# Patient Record
Sex: MALE | Race: OTHER | HISPANIC OR LATINO | URBAN - METROPOLITAN AREA
[De-identification: names, ages, dates, MRNs, and addresses within clinical notes are randomized per-mention and may not be internally consistent; named-entity substitution may affect disease eponyms.]

---

## 2018-09-06 ENCOUNTER — EMERGENCY (EMERGENCY)
Facility: HOSPITAL | Age: 43
LOS: 1 days | Discharge: HOME | End: 2018-09-06
Attending: EMERGENCY MEDICINE
Payer: COMMERCIAL

## 2018-09-06 VITALS — WEIGHT: 179.9 LBS | HEIGHT: 66 IN

## 2018-09-06 LAB
ALBUMIN SERPL ELPH-MCNC: 4.1 G/DL — SIGNIFICANT CHANGE UP (ref 3.3–5.2)
ALP SERPL-CCNC: 63 U/L — SIGNIFICANT CHANGE UP (ref 40–120)
ALT FLD-CCNC: 26 U/L — SIGNIFICANT CHANGE UP
ANION GAP SERPL CALC-SCNC: 11 MMOL/L — SIGNIFICANT CHANGE UP (ref 5–17)
APTT BLD: 29.2 SEC — SIGNIFICANT CHANGE UP (ref 27.5–37.4)
AST SERPL-CCNC: 20 U/L — SIGNIFICANT CHANGE UP
BASOPHILS # BLD AUTO: 0 K/UL — SIGNIFICANT CHANGE UP (ref 0–0.2)
BASOPHILS NFR BLD AUTO: 0.1 % — SIGNIFICANT CHANGE UP (ref 0–2)
BILIRUB SERPL-MCNC: <0.2 MG/DL — LOW (ref 0.4–2)
BLD GP AB SCN SERPL QL: SIGNIFICANT CHANGE UP
BUN SERPL-MCNC: 17 MG/DL — SIGNIFICANT CHANGE UP (ref 8–20)
CALCIUM SERPL-MCNC: 9 MG/DL — SIGNIFICANT CHANGE UP (ref 8.6–10.2)
CHLORIDE SERPL-SCNC: 105 MMOL/L — SIGNIFICANT CHANGE UP (ref 98–107)
CO2 SERPL-SCNC: 24 MMOL/L — SIGNIFICANT CHANGE UP (ref 22–29)
CREAT SERPL-MCNC: 0.82 MG/DL — SIGNIFICANT CHANGE UP (ref 0.5–1.3)
EOSINOPHIL # BLD AUTO: 0.2 K/UL — SIGNIFICANT CHANGE UP (ref 0–0.5)
EOSINOPHIL NFR BLD AUTO: 3.2 % — SIGNIFICANT CHANGE UP (ref 0–5)
GLUCOSE SERPL-MCNC: 142 MG/DL — HIGH (ref 70–115)
HCT VFR BLD CALC: 42.8 % — SIGNIFICANT CHANGE UP (ref 42–52)
HGB BLD-MCNC: 13.7 G/DL — LOW (ref 14–18)
INR BLD: 1.05 RATIO — SIGNIFICANT CHANGE UP (ref 0.88–1.16)
LYMPHOCYTES # BLD AUTO: 1.5 K/UL — SIGNIFICANT CHANGE UP (ref 1–4.8)
LYMPHOCYTES # BLD AUTO: 22.5 % — SIGNIFICANT CHANGE UP (ref 20–55)
MCHC RBC-ENTMCNC: 28.7 PG — SIGNIFICANT CHANGE UP (ref 27–31)
MCHC RBC-ENTMCNC: 32 G/DL — SIGNIFICANT CHANGE UP (ref 32–36)
MCV RBC AUTO: 89.5 FL — SIGNIFICANT CHANGE UP (ref 80–94)
MONOCYTES # BLD AUTO: 0.6 K/UL — SIGNIFICANT CHANGE UP (ref 0–0.8)
MONOCYTES NFR BLD AUTO: 8.9 % — SIGNIFICANT CHANGE UP (ref 3–10)
NEUTROPHILS # BLD AUTO: 4.4 K/UL — SIGNIFICANT CHANGE UP (ref 1.8–8)
NEUTROPHILS NFR BLD AUTO: 65 % — SIGNIFICANT CHANGE UP (ref 37–73)
PLATELET # BLD AUTO: 167 K/UL — SIGNIFICANT CHANGE UP (ref 150–400)
POTASSIUM SERPL-MCNC: 3.8 MMOL/L — SIGNIFICANT CHANGE UP (ref 3.5–5.3)
POTASSIUM SERPL-SCNC: 3.8 MMOL/L — SIGNIFICANT CHANGE UP (ref 3.5–5.3)
PROT SERPL-MCNC: 7.4 G/DL — SIGNIFICANT CHANGE UP (ref 6.6–8.7)
PROTHROM AB SERPL-ACNC: 11.6 SEC — SIGNIFICANT CHANGE UP (ref 9.8–12.7)
RBC # BLD: 4.78 M/UL — SIGNIFICANT CHANGE UP (ref 4.6–6.2)
RBC # FLD: 13.1 % — SIGNIFICANT CHANGE UP (ref 11–15.6)
SODIUM SERPL-SCNC: 140 MMOL/L — SIGNIFICANT CHANGE UP (ref 135–145)
TYPE + AB SCN PNL BLD: SIGNIFICANT CHANGE UP
WBC # BLD: 6.8 K/UL — SIGNIFICANT CHANGE UP (ref 4.8–10.8)
WBC # FLD AUTO: 6.8 K/UL — SIGNIFICANT CHANGE UP (ref 4.8–10.8)

## 2018-09-06 PROCEDURE — 73630 X-RAY EXAM OF FOOT: CPT | Mod: 26,RT

## 2018-09-06 PROCEDURE — 73590 X-RAY EXAM OF LOWER LEG: CPT | Mod: 26,RT

## 2018-09-06 PROCEDURE — 99283 EMERGENCY DEPT VISIT LOW MDM: CPT

## 2018-09-06 PROCEDURE — 73610 X-RAY EXAM OF ANKLE: CPT | Mod: 26,RT,77

## 2018-09-06 PROCEDURE — 73610 X-RAY EXAM OF ANKLE: CPT | Mod: 26,RT

## 2018-09-06 RX ORDER — PROPOFOL 10 MG/ML
100 INJECTION, EMULSION INTRAVENOUS ONCE
Qty: 0 | Refills: 0 | Status: COMPLETED | OUTPATIENT
Start: 2018-09-06 | End: 2018-09-06

## 2018-09-06 RX ORDER — MORPHINE SULFATE 50 MG/1
2 CAPSULE, EXTENDED RELEASE ORAL ONCE
Qty: 0 | Refills: 0 | Status: DISCONTINUED | OUTPATIENT
Start: 2018-09-06 | End: 2018-09-06

## 2018-09-06 RX ORDER — FENTANYL CITRATE 50 UG/ML
100 INJECTION INTRAVENOUS ONCE
Qty: 0 | Refills: 0 | Status: DISCONTINUED | OUTPATIENT
Start: 2018-09-06 | End: 2018-09-06

## 2018-09-06 RX ADMIN — FENTANYL CITRATE 100 MICROGRAM(S): 50 INJECTION INTRAVENOUS at 21:46

## 2018-09-06 RX ADMIN — PROPOFOL 100 MILLIGRAM(S): 10 INJECTION, EMULSION INTRAVENOUS at 21:46

## 2018-09-06 RX ADMIN — MORPHINE SULFATE 2 MILLIGRAM(S): 50 CAPSULE, EXTENDED RELEASE ORAL at 21:17

## 2018-09-06 NOTE — ED ADULT NURSE NOTE - NSIMPLEMENTINTERV_GEN_ALL_ED
Implemented All Universal Safety Interventions:  [place] to call system. Call bell, personal items and telephone within reach. Instruct patient to call for assistance. Room bathroom lighting operational. Non-slip footwear when patient is off stretcher. Physically safe environment: no spills, clutter or unnecessary equipment. Stretcher in lowest position, wheels locked, appropriate side rails in place.
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
## 2018-09-06 NOTE — ED STATDOCS - MUSCULOSKELETAL, MLM
no laxity to right ankle, joint intact, dp 2+, able to move toes, swelling to lateral malleolus. slight deformity at ankle

## 2018-09-06 NOTE — ED STATDOCS - PROGRESS NOTE DETAILS
PT evaluated by intake physician. HPI/PE/ROS as noted above. Will follow up plan per intake physician. + distal tib/fib fracture, Consult Ortho, PA to see Pt in ED. + distal tib/fib fracture, Consult Ortho PA to see Pt in ED.  conscious sedation consent obtained, Pt aware of risks and agrees with procedure. Ortho request CT ankle, MD advises Pt to follow up tomorrow as out pt. MD has Pt number and will contact Pt tomorrow. referral to Ortho MD given. Pt case signed out to ALISSA Jean Baptitse to follow CT ankle, dc home with crutches and ortho follow up PT seen resting comfortably in no acute distress, no acute complaints at this time, PT tolerating PO intake,  PT informed of all results, , PT will be DC home with follow up to ortho, PT educated on proper use of crutches and demonstrated proficient use prior to discharge. pain medication pt educated about when to return to the ED if needed. PT verbalizes that he understands all instructions and results.

## 2018-09-06 NOTE — ED STATDOCS - ATTENDING CONTRIBUTION TO CARE
I, Oscar Ford, performed the initial face to face bedside interview with this patient regarding history of present illness, review of symptoms and relevant past medical, social and family history.  I completed an independent physical examination.  I was the initial provider who evaluated this patient. I have signed out the follow up of any pending tests (i.e. labs, radiological studies) to the ACP.  I have communicated the patient’s plan of care and disposition with the ACP.

## 2018-09-07 VITALS
TEMPERATURE: 98 F | HEART RATE: 64 BPM | DIASTOLIC BLOOD PRESSURE: 79 MMHG | SYSTOLIC BLOOD PRESSURE: 128 MMHG | OXYGEN SATURATION: 98 % | RESPIRATION RATE: 20 BRPM

## 2018-09-07 PROBLEM — Z00.00 ENCOUNTER FOR PREVENTIVE HEALTH EXAMINATION: Status: ACTIVE | Noted: 2018-09-07

## 2018-09-07 PROCEDURE — 85730 THROMBOPLASTIN TIME PARTIAL: CPT

## 2018-09-07 PROCEDURE — 99285 EMERGENCY DEPT VISIT HI MDM: CPT | Mod: 25

## 2018-09-07 PROCEDURE — 27810 TREATMENT OF ANKLE FRACTURE: CPT | Mod: RT

## 2018-09-07 PROCEDURE — 73700 CT LOWER EXTREMITY W/O DYE: CPT

## 2018-09-07 PROCEDURE — 86901 BLOOD TYPING SEROLOGIC RH(D): CPT

## 2018-09-07 PROCEDURE — 96374 THER/PROPH/DIAG INJ IV PUSH: CPT | Mod: XU

## 2018-09-07 PROCEDURE — 73630 X-RAY EXAM OF FOOT: CPT

## 2018-09-07 PROCEDURE — 73700 CT LOWER EXTREMITY W/O DYE: CPT | Mod: 26,RT

## 2018-09-07 PROCEDURE — 36415 COLL VENOUS BLD VENIPUNCTURE: CPT

## 2018-09-07 PROCEDURE — 86900 BLOOD TYPING SEROLOGIC ABO: CPT

## 2018-09-07 PROCEDURE — 73590 X-RAY EXAM OF LOWER LEG: CPT

## 2018-09-07 PROCEDURE — 86850 RBC ANTIBODY SCREEN: CPT

## 2018-09-07 PROCEDURE — 73610 X-RAY EXAM OF ANKLE: CPT

## 2018-09-07 PROCEDURE — 80053 COMPREHEN METABOLIC PANEL: CPT

## 2018-09-07 PROCEDURE — 85610 PROTHROMBIN TIME: CPT

## 2018-09-07 PROCEDURE — 85027 COMPLETE CBC AUTOMATED: CPT

## 2018-09-07 PROCEDURE — T1013: CPT

## 2018-09-07 PROCEDURE — 96375 TX/PRO/DX INJ NEW DRUG ADDON: CPT | Mod: XU

## 2018-09-07 RX ORDER — IBUPROFEN 200 MG
1 TABLET ORAL
Qty: 20 | Refills: 0 | OUTPATIENT
Start: 2018-09-07 | End: 2018-09-11

## 2018-09-07 RX ORDER — OXYCODONE AND ACETAMINOPHEN 5; 325 MG/1; MG/1
1 TABLET ORAL ONCE
Qty: 0 | Refills: 0 | Status: DISCONTINUED | OUTPATIENT
Start: 2018-09-07 | End: 2018-09-07

## 2018-09-07 RX ADMIN — OXYCODONE AND ACETAMINOPHEN 1 TABLET(S): 5; 325 TABLET ORAL at 02:24

## 2018-09-07 NOTE — PROCEDURE NOTE - NSPERIPNEUEVAL_GEN_A_CORE
Post-application: Motor, sensory, and vascular responses intact in the injured extremity./Pre-application: Motor, sensory, and vascular responses intact in the injured extremity .
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
## 2018-09-07 NOTE — PROCEDURE NOTE - NSPOSTCAREGUIDE_GEN_A_CORE
Keep the cast/splint/dressing clean and dry/Understanding of care for injured extremity verbalized/Instructed patient/caregiver to follow-up with primary care physician/Elevate the injured extremity as instructed

## 2018-09-07 NOTE — PROCEDURE NOTE - NSPERIPVASCEVA_GEN_A_CORE
fingers/toes warm to touch/swelling/no cyanosis of extremity/no paresthesia/capillary refill time < 2 seconds/pre-application: responses intact/post-application: responses intact

## 2018-09-10 ENCOUNTER — APPOINTMENT (OUTPATIENT)
Dept: ORTHOPEDIC SURGERY | Facility: CLINIC | Age: 43
End: 2018-09-10
Payer: COMMERCIAL

## 2018-09-10 VITALS
WEIGHT: 180 LBS | BODY MASS INDEX: 28.93 KG/M2 | SYSTOLIC BLOOD PRESSURE: 150 MMHG | DIASTOLIC BLOOD PRESSURE: 86 MMHG | HEIGHT: 66 IN | HEART RATE: 77 BPM

## 2018-09-10 PROCEDURE — 99203 OFFICE O/P NEW LOW 30 MIN: CPT

## 2018-09-13 ENCOUNTER — OUTPATIENT (OUTPATIENT)
Dept: OUTPATIENT SERVICES | Facility: HOSPITAL | Age: 43
LOS: 1 days | End: 2018-09-13
Payer: COMMERCIAL

## 2018-09-13 VITALS
HEIGHT: 66 IN | HEART RATE: 80 BPM | RESPIRATION RATE: 20 BRPM | WEIGHT: 179.9 LBS | TEMPERATURE: 98 F | DIASTOLIC BLOOD PRESSURE: 84 MMHG | SYSTOLIC BLOOD PRESSURE: 126 MMHG

## 2018-09-13 DIAGNOSIS — Z29.9 ENCOUNTER FOR PROPHYLACTIC MEASURES, UNSPECIFIED: ICD-10-CM

## 2018-09-13 DIAGNOSIS — Z01.818 ENCOUNTER FOR OTHER PREPROCEDURAL EXAMINATION: ICD-10-CM

## 2018-09-13 DIAGNOSIS — S82.899A OTHER FRACTURE OF UNSPECIFIED LOWER LEG, INITIAL ENCOUNTER FOR CLOSED FRACTURE: ICD-10-CM

## 2018-09-13 LAB
APPEARANCE UR: CLEAR — SIGNIFICANT CHANGE UP
BILIRUB UR-MCNC: NEGATIVE — SIGNIFICANT CHANGE UP
COLOR SPEC: YELLOW — SIGNIFICANT CHANGE UP
DIFF PNL FLD: ABNORMAL
EPI CELLS # UR: NEGATIVE — SIGNIFICANT CHANGE UP
GLUCOSE UR QL: NEGATIVE MG/DL — SIGNIFICANT CHANGE UP
KETONES UR-MCNC: NEGATIVE — SIGNIFICANT CHANGE UP
LEUKOCYTE ESTERASE UR-ACNC: NEGATIVE — SIGNIFICANT CHANGE UP
MRSA PCR RESULT.: SIGNIFICANT CHANGE UP
NITRITE UR-MCNC: NEGATIVE — SIGNIFICANT CHANGE UP
PH UR: 6.5 — SIGNIFICANT CHANGE UP (ref 5–8)
PROT UR-MCNC: NEGATIVE MG/DL — SIGNIFICANT CHANGE UP
RBC CASTS # UR COMP ASSIST: SIGNIFICANT CHANGE UP /HPF (ref 0–4)
S AUREUS DNA NOSE QL NAA+PROBE: SIGNIFICANT CHANGE UP
SP GR SPEC: 1.01 — SIGNIFICANT CHANGE UP (ref 1.01–1.02)
UROBILINOGEN FLD QL: NEGATIVE MG/DL — SIGNIFICANT CHANGE UP
WBC UR QL: SIGNIFICANT CHANGE UP

## 2018-09-13 PROCEDURE — 93010 ELECTROCARDIOGRAM REPORT: CPT

## 2018-09-13 PROCEDURE — 87641 MR-STAPH DNA AMP PROBE: CPT

## 2018-09-13 PROCEDURE — 93005 ELECTROCARDIOGRAM TRACING: CPT

## 2018-09-13 PROCEDURE — 71046 X-RAY EXAM CHEST 2 VIEWS: CPT

## 2018-09-13 PROCEDURE — G0463: CPT

## 2018-09-13 PROCEDURE — 71046 X-RAY EXAM CHEST 2 VIEWS: CPT | Mod: 26

## 2018-09-13 PROCEDURE — 87640 STAPH A DNA AMP PROBE: CPT

## 2018-09-13 PROCEDURE — 81001 URINALYSIS AUTO W/SCOPE: CPT

## 2018-09-13 RX ORDER — INFLUENZA VIRUS VACCINE 15; 15; 15; 15 UG/.5ML; UG/.5ML; UG/.5ML; UG/.5ML
0.5 SUSPENSION INTRAMUSCULAR ONCE
Qty: 0 | Refills: 0 | Status: DISCONTINUED | OUTPATIENT
Start: 2018-09-13 | End: 2018-09-28
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
## 2018-09-13 NOTE — H&P PST ADULT - ASSESSMENT
Pt is a 43 y.o male with no significant PMH undergoing open reduction internal fixation right ankle. Instructed to hold any medications containing ibuprofen and aspirin starting today.   CAPRINI SCORE [CLOT]    AGE RELATED RISK FACTORS                                                       MOBILITY RELATED FACTORS  [x ] Age 41-60 years                                            (1 Point)                  [ ] Bed rest                                                        (1 Point)  [ ] Age: 61-74 years                                           (2 Points)                 [x] Plaster cast                                                   (2 Points)  [ ] Age= 75 years                                              (3 Points)                 [ ] Bed bound for more than 72 hours                 (2 Points)    DISEASE RELATED RISK FACTORS                                               GENDER SPECIFIC FACTORS  [ ] Edema in the lower extremities                       (1 Point)                  [ ] Pregnancy                                                     (1 Point)  [ ] Varicose veins                                               (1 Point)                  [ ] Post-partum < 6 weeks                                   (1 Point)             [x ] BMI > 25 Kg/m2                                            (1 Point)                  [ ] Hormonal therapy  or oral contraception          (1 Point)                 [ ] Sepsis (in the previous month)                        (1 Point)                  [ ] History of pregnancy complications                 (1 point)  [ ] Pneumonia or serious lung disease                                               [ ] Unexplained or recurrent                     (1 Point)           (in the previous month)                               (1 Point)  [ ] Abnormal pulmonary function test                     (1 Point)                 SURGERY RELATED RISK FACTORS  [ ] Acute myocardial infarction                              (1 Point)                 [ ]  Section                                             (1 Point)  [ ] Congestive heart failure (in the previous month)  (1 Point)               [ ] Minor surgery                                                  (1 Point)   [ ] Inflammatory bowel disease                             (1 Point)                 [ ] Arthroscopic surgery                                        (2 Points)  [ ] Central venous access                                      (2 Points)                [ x] General surgery lasting more than 45 minutes   (2 Points)       [ ] Stroke (in the previous month)                          (5 Points)               [ ] Elective arthroplasty                                         (5 Points)                                                                                                                                               HEMATOLOGY RELATED FACTORS                                                 TRAUMA RELATED RISK FACTORS  [ ] Prior episodes of VTE                                     (3 Points)                 [ ] Fracture of the hip, pelvis, or leg                       (5 Points)  [ ] Positive family history for VTE                         (3 Points)                 [ ] Acute spinal cord injury (in the previous month)  (5 Points)  [ ] Prothrombin 26482 A                                     (3 Points)                 [ ] Paralysis  (less than 1 month)                             (5 Points)  [ ] Factor V Leiden                                             (3 Points)                  [ ] Multiple Trauma within 1 month                        (5 Points)  [ ] Lupus anticoagulants                                     (3 Points)                                                           [ ] Anticardiolipin antibodies                               (3 Points)                                                       [ ] High homocysteine in the blood                      (3 Points)                                             [ ] Other congenital or acquired thrombophilia      (3 Points)                                                [ ] Heparin induced thrombocytopenia                  (3 Points)                                          Total Score [  6        ]

## 2018-09-13 NOTE — PATIENT PROFILE ADULT. - LANGUAGE ASSISTANCE NEEDED
Drew  [facility] [phone]/Yes-Patient/Caregiver accepts free interpretation services...

## 2018-09-13 NOTE — H&P PST ADULT - FAMILY HISTORY
Mother  Still living? No  Family history of diabetes mellitus, Age at diagnosis: 61-70  Family history of breast cancer in mother, Age at diagnosis: 61-70     Father  Still living? No  Family history of throat cancer, Age at diagnosis: 41-50  Family history of alcoholism in father, Age at diagnosis: 41-50

## 2018-09-13 NOTE — PATIENT PROFILE ADULT. - LEARNING ASSESSMENT (PATIENT) ADDITIONAL COMMENTS
Instructed pt verbally in Cambodian via telephone  and in writing in Cambodian on pre-op instructions, tips for safer surgery, pain management scale, and verbalized understanding of all.

## 2018-09-13 NOTE — H&P PST ADULT - NSANTHOSAYNRD_GEN_A_CORE
No. SANGEETA screening performed.  STOP BANG Legend: 0-2 = LOW Risk; 3-4 = INTERMEDIATE Risk; 5-8 = HIGH Risk

## 2018-09-16 PROBLEM — F17.200 CURRENT SMOKER: Status: ACTIVE | Noted: 2018-09-10

## 2018-09-16 PROBLEM — Z80.9 FAMILY HISTORY OF MALIGNANT NEOPLASM: Status: ACTIVE | Noted: 2018-09-10

## 2018-09-16 PROBLEM — Z78.9 CONSUMES ALCOHOL OCCASIONALLY: Status: ACTIVE | Noted: 2018-09-10

## 2018-09-17 ENCOUNTER — APPOINTMENT (OUTPATIENT)
Dept: ORTHOPEDIC SURGERY | Facility: CLINIC | Age: 43
End: 2018-09-17
Payer: COMMERCIAL

## 2018-09-17 VITALS
WEIGHT: 180 LBS | HEART RATE: 82 BPM | HEIGHT: 66 IN | SYSTOLIC BLOOD PRESSURE: 138 MMHG | DIASTOLIC BLOOD PRESSURE: 80 MMHG | BODY MASS INDEX: 28.93 KG/M2

## 2018-09-17 DIAGNOSIS — Z80.9 FAMILY HISTORY OF MALIGNANT NEOPLASM, UNSPECIFIED: ICD-10-CM

## 2018-09-17 DIAGNOSIS — Z78.9 OTHER SPECIFIED HEALTH STATUS: ICD-10-CM

## 2018-09-17 DIAGNOSIS — F17.200 NICOTINE DEPENDENCE, UNSPECIFIED, UNCOMPLICATED: ICD-10-CM

## 2018-09-17 PROCEDURE — 99024 POSTOP FOLLOW-UP VISIT: CPT

## 2018-09-18 ENCOUNTER — INPATIENT (INPATIENT)
Facility: HOSPITAL | Age: 43
LOS: 0 days | Discharge: HOME | DRG: 493 | End: 2018-09-19
Attending: ORTHOPAEDIC SURGERY | Admitting: ORTHOPAEDIC SURGERY
Payer: SELF-PAY

## 2018-09-18 ENCOUNTER — TRANSCRIPTION ENCOUNTER (OUTPATIENT)
Age: 43
End: 2018-09-18

## 2018-09-18 VITALS
TEMPERATURE: 98 F | RESPIRATION RATE: 16 BRPM | OXYGEN SATURATION: 99 % | SYSTOLIC BLOOD PRESSURE: 164 MMHG | HEIGHT: 66 IN | DIASTOLIC BLOOD PRESSURE: 81 MMHG | HEART RATE: 77 BPM | WEIGHT: 179.9 LBS

## 2018-09-18 DIAGNOSIS — S82.899A OTHER FRACTURE OF UNSPECIFIED LOWER LEG, INITIAL ENCOUNTER FOR CLOSED FRACTURE: ICD-10-CM

## 2018-09-18 DIAGNOSIS — S82.841A DISPLACED BIMALLEOLAR FRACTURE OF RIGHT LOWER LEG, INITIAL ENCOUNTER FOR CLOSED FRACTURE: ICD-10-CM

## 2018-09-18 LAB
ALLERGY+IMMUNOLOGY DIAG STUDY NOTE: SIGNIFICANT CHANGE UP
BLD GP AB SCN SERPL QL: ABNORMAL
BLD GP AB SCN SERPL QL: SIGNIFICANT CHANGE UP
DIR ANTIGLOB POLYSPECIFIC INTERPRETATION: SIGNIFICANT CHANGE UP
TYPE + AB SCN PNL BLD: SIGNIFICANT CHANGE UP

## 2018-09-18 PROCEDURE — 86077 PHYS BLOOD BANK SERV XMATCH: CPT

## 2018-09-18 RX ORDER — OXYCODONE HYDROCHLORIDE 5 MG/1
1 TABLET ORAL
Qty: 54 | Refills: 0 | OUTPATIENT
Start: 2018-09-18 | End: 2018-09-26

## 2018-09-18 RX ORDER — OXYCODONE HYDROCHLORIDE 5 MG/1
5 TABLET ORAL ONCE
Qty: 0 | Refills: 0 | Status: DISCONTINUED | OUTPATIENT
Start: 2018-09-18 | End: 2018-09-18

## 2018-09-18 RX ORDER — SODIUM CHLORIDE 9 MG/ML
1000 INJECTION, SOLUTION INTRAVENOUS
Qty: 0 | Refills: 0 | Status: DISCONTINUED | OUTPATIENT
Start: 2018-09-18 | End: 2018-09-19

## 2018-09-18 RX ORDER — HYDROMORPHONE HYDROCHLORIDE 2 MG/ML
0.5 INJECTION INTRAMUSCULAR; INTRAVENOUS; SUBCUTANEOUS
Qty: 0 | Refills: 0 | Status: DISCONTINUED | OUTPATIENT
Start: 2018-09-18 | End: 2018-09-18

## 2018-09-18 RX ORDER — FENTANYL CITRATE 50 UG/ML
25 INJECTION INTRAVENOUS
Qty: 0 | Refills: 0 | Status: DISCONTINUED | OUTPATIENT
Start: 2018-09-18 | End: 2018-09-18

## 2018-09-18 RX ORDER — FENTANYL CITRATE 50 UG/ML
50 INJECTION INTRAVENOUS
Qty: 0 | Refills: 0 | Status: DISCONTINUED | OUTPATIENT
Start: 2018-09-18 | End: 2018-09-19

## 2018-09-18 RX ORDER — OXYCODONE HYDROCHLORIDE 5 MG/1
5 TABLET ORAL
Qty: 0 | Refills: 0 | Status: DISCONTINUED | OUTPATIENT
Start: 2018-09-18 | End: 2018-09-19

## 2018-09-18 RX ORDER — HYDROMORPHONE HYDROCHLORIDE 2 MG/ML
0.5 INJECTION INTRAMUSCULAR; INTRAVENOUS; SUBCUTANEOUS EVERY 6 HOURS
Qty: 0 | Refills: 0 | Status: DISCONTINUED | OUTPATIENT
Start: 2018-09-18 | End: 2018-09-19

## 2018-09-18 RX ORDER — SODIUM CHLORIDE 9 MG/ML
3 INJECTION INTRAMUSCULAR; INTRAVENOUS; SUBCUTANEOUS ONCE
Qty: 0 | Refills: 0 | Status: DISCONTINUED | OUTPATIENT
Start: 2018-09-18 | End: 2018-09-18

## 2018-09-18 RX ORDER — CEFAZOLIN SODIUM 1 G
2000 VIAL (EA) INJECTION ONCE
Qty: 0 | Refills: 0 | Status: DISCONTINUED | OUTPATIENT
Start: 2018-09-18 | End: 2018-09-18

## 2018-09-18 RX ORDER — ONDANSETRON 8 MG/1
4 TABLET, FILM COATED ORAL ONCE
Qty: 0 | Refills: 0 | Status: DISCONTINUED | OUTPATIENT
Start: 2018-09-18 | End: 2018-09-19

## 2018-09-18 RX ORDER — OXYCODONE HYDROCHLORIDE 5 MG/1
5 TABLET ORAL EVERY 4 HOURS
Qty: 0 | Refills: 0 | Status: DISCONTINUED | OUTPATIENT
Start: 2018-09-18 | End: 2018-09-18

## 2018-09-18 RX ORDER — ASPIRIN/CALCIUM CARB/MAGNESIUM 324 MG
1 TABLET ORAL
Qty: 56 | Refills: 0 | OUTPATIENT
Start: 2018-09-18 | End: 2018-10-15

## 2018-09-18 RX ORDER — OXYCODONE 5 MG/1
5 TABLET ORAL
Qty: 50 | Refills: 0 | Status: ACTIVE | COMMUNITY
Start: 2018-09-18 | End: 1900-01-01

## 2018-09-18 RX ORDER — ONDANSETRON 8 MG/1
1 TABLET, FILM COATED ORAL
Qty: 40 | Refills: 0 | OUTPATIENT
Start: 2018-09-18 | End: 2018-09-27

## 2018-09-18 RX ORDER — HYDROMORPHONE HYDROCHLORIDE 2 MG/ML
2 INJECTION INTRAMUSCULAR; INTRAVENOUS; SUBCUTANEOUS EVERY 4 HOURS
Qty: 0 | Refills: 0 | Status: DISCONTINUED | OUTPATIENT
Start: 2018-09-18 | End: 2018-09-19

## 2018-09-18 RX ORDER — ONDANSETRON 4 MG/1
4 TABLET ORAL
Qty: 56 | Refills: 0 | Status: ACTIVE | COMMUNITY
Start: 2018-09-18 | End: 1900-01-01

## 2018-09-18 RX ORDER — HYDROMORPHONE HYDROCHLORIDE 2 MG/ML
1 INJECTION INTRAMUSCULAR; INTRAVENOUS; SUBCUTANEOUS
Qty: 0 | Refills: 0 | Status: COMPLETED | OUTPATIENT
Start: 2018-09-18 | End: 2018-09-19

## 2018-09-18 RX ORDER — ASPIRIN 325 MG/1
325 TABLET, FILM COATED ORAL TWICE DAILY
Qty: 56 | Refills: 0 | Status: ACTIVE | COMMUNITY
Start: 2018-09-18 | End: 1900-01-01

## 2018-09-18 RX ORDER — ACETAMINOPHEN 500 MG
975 TABLET ORAL EVERY 8 HOURS
Qty: 0 | Refills: 0 | Status: DISCONTINUED | OUTPATIENT
Start: 2018-09-18 | End: 2018-09-19

## 2018-09-18 RX ORDER — ENOXAPARIN SODIUM 100 MG/ML
40 INJECTION SUBCUTANEOUS DAILY
Qty: 0 | Refills: 0 | Status: DISCONTINUED | OUTPATIENT
Start: 2018-09-19 | End: 2018-09-19

## 2018-09-18 RX ORDER — OXYCODONE HYDROCHLORIDE 5 MG/1
10 TABLET ORAL
Qty: 0 | Refills: 0 | Status: DISCONTINUED | OUTPATIENT
Start: 2018-09-18 | End: 2018-09-19

## 2018-09-18 RX ORDER — CEFAZOLIN SODIUM 1 G
2000 VIAL (EA) INJECTION EVERY 6 HOURS
Qty: 0 | Refills: 0 | Status: COMPLETED | OUTPATIENT
Start: 2018-09-18 | End: 2018-09-19

## 2018-09-18 RX ADMIN — HYDROMORPHONE HYDROCHLORIDE 0.5 MILLIGRAM(S): 2 INJECTION INTRAMUSCULAR; INTRAVENOUS; SUBCUTANEOUS at 22:00

## 2018-09-18 RX ADMIN — OXYCODONE HYDROCHLORIDE 5 MILLIGRAM(S): 5 TABLET ORAL at 22:23

## 2018-09-18 RX ADMIN — FENTANYL CITRATE 25 MICROGRAM(S): 50 INJECTION INTRAVENOUS at 21:50

## 2018-09-18 RX ADMIN — FENTANYL CITRATE 25 MICROGRAM(S): 50 INJECTION INTRAVENOUS at 22:00

## 2018-09-18 RX ADMIN — SODIUM CHLORIDE 100 MILLILITER(S): 9 INJECTION, SOLUTION INTRAVENOUS at 23:19

## 2018-09-18 RX ADMIN — OXYCODONE HYDROCHLORIDE 5 MILLIGRAM(S): 5 TABLET ORAL at 22:41

## 2018-09-18 RX ADMIN — SODIUM CHLORIDE 75 MILLILITER(S): 9 INJECTION, SOLUTION INTRAVENOUS at 21:56

## 2018-09-18 RX ADMIN — FENTANYL CITRATE 25 MICROGRAM(S): 50 INJECTION INTRAVENOUS at 22:09

## 2018-09-18 RX ADMIN — HYDROMORPHONE HYDROCHLORIDE 0.5 MILLIGRAM(S): 2 INJECTION INTRAMUSCULAR; INTRAVENOUS; SUBCUTANEOUS at 21:58

## 2018-09-18 RX ADMIN — HYDROMORPHONE HYDROCHLORIDE 0.5 MILLIGRAM(S): 2 INJECTION INTRAMUSCULAR; INTRAVENOUS; SUBCUTANEOUS at 22:13

## 2018-09-18 RX ADMIN — HYDROMORPHONE HYDROCHLORIDE 0.5 MILLIGRAM(S): 2 INJECTION INTRAMUSCULAR; INTRAVENOUS; SUBCUTANEOUS at 21:48

## 2018-09-18 RX ADMIN — FENTANYL CITRATE 25 MICROGRAM(S): 50 INJECTION INTRAVENOUS at 22:15

## 2018-09-18 RX ADMIN — FENTANYL CITRATE 25 MICROGRAM(S): 50 INJECTION INTRAVENOUS at 22:06

## 2018-09-18 RX ADMIN — HYDROMORPHONE HYDROCHLORIDE 0.5 MILLIGRAM(S): 2 INJECTION INTRAMUSCULAR; INTRAVENOUS; SUBCUTANEOUS at 22:41

## 2018-09-18 RX ADMIN — FENTANYL CITRATE 25 MICROGRAM(S): 50 INJECTION INTRAVENOUS at 22:05

## 2018-09-18 RX ADMIN — HYDROMORPHONE HYDROCHLORIDE 0.5 MILLIGRAM(S): 2 INJECTION INTRAMUSCULAR; INTRAVENOUS; SUBCUTANEOUS at 22:09

## 2018-09-18 RX ADMIN — HYDROMORPHONE HYDROCHLORIDE 1 MILLIGRAM(S): 2 INJECTION INTRAMUSCULAR; INTRAVENOUS; SUBCUTANEOUS at 23:50

## 2018-09-18 RX ADMIN — OXYCODONE HYDROCHLORIDE 5 MILLIGRAM(S): 5 TABLET ORAL at 22:06

## 2018-09-18 RX ADMIN — FENTANYL CITRATE 25 MICROGRAM(S): 50 INJECTION INTRAVENOUS at 22:14

## 2018-09-18 RX ADMIN — HYDROMORPHONE HYDROCHLORIDE 0.5 MILLIGRAM(S): 2 INJECTION INTRAMUSCULAR; INTRAVENOUS; SUBCUTANEOUS at 22:23

## 2018-09-18 RX ADMIN — HYDROMORPHONE HYDROCHLORIDE 1 MILLIGRAM(S): 2 INJECTION INTRAMUSCULAR; INTRAVENOUS; SUBCUTANEOUS at 22:52

## 2018-09-18 NOTE — H&P PST ADULT - PROBLEM SELECTOR PLAN 1
npo  NWB  plan is OR for ORIF of ankle fracture  splint after procedure  ASA twice daily after procedure  pain medicataion  DC after procedure

## 2018-09-18 NOTE — ASU DISCHARGE PLAN (ADULT/PEDIATRIC). - MEDICATION SUMMARY - MEDICATIONS TO TAKE
I will START or STAY ON the medications listed below when I get home from the hospital:     mg oral tablet  -- 1 tab(s) by mouth every 6 hours   -- Do not take this drug if you are pregnant.  It is very important that you take or use this exactly as directed.  Do not skip doses or discontinue unless directed by your doctor.  May cause drowsiness or dizziness.  Obtain medical advice before taking any non-prescription drugs as some may affect the action of this medication.  Take with food or milk.    -- Indication: For as needed for mild pain    Percocet 5/325 oral tablet  -- 1 tab(s) by mouth every 6 hours MDD:4  -- Caution federal law prohibits the transfer of this drug to any person other  than the person for whom it was prescribed.  May cause drowsiness.  Alcohol may intensify this effect.  Use care when operating dangerous machinery.  This prescription cannot be refilled.  This product contains acetaminophen.  Do not use  with any other product containing acetaminophen to prevent possible liver damage.  Using more of this medication than prescribed may cause serious breathing problems.    -- Indication: For as needed for moderate to severe pain

## 2018-09-18 NOTE — DISCHARGE NOTE ADULT - PATIENT PORTAL LINK FT
You can access the [facility] Patient Portal, offered by [facility], by registering with the following website: [email or web address]

## 2018-09-18 NOTE — H&P PST ADULT - MUSCULOSKELETAL COMMENTS
ttp over lateral and medial mal, facture blister laterallymotor intact EHL/FHL/TA/GSC; SILT L4-S1, DP 2+

## 2018-09-18 NOTE — ASU DISCHARGE PLAN (ADULT/PEDIATRIC). - NOTIFY
Fever greater than 101/Swelling that continues/Persistent Nausea and Vomiting/Bleeding that does not stop/Numbness, color, or temperature change to extremity

## 2018-09-18 NOTE — DISCHARGE NOTE ADULT - CARE PROVIDER_API CALL
Ryne Figueroa (DO), [facility]  [address]  [place] [address]  Phone: [phone]  Fax: [phone]

## 2018-09-18 NOTE — DISCHARGE NOTE ADULT - MEDICATION SUMMARY - MEDICATIONS TO STOP TAKING
I will STOP taking the medications listed below when I get home from the hospital:     mg oral tablet  -- 1 tab(s) by mouth every 6 hours   -- Do not take this drug if you are pregnant.  It is very important that you take or use this exactly as directed.  Do not skip doses or discontinue unless directed by your doctor.  May cause drowsiness or dizziness.  Obtain medical advice before taking any non-prescription drugs as some may affect the action of this medication.  Take with food or milk.

## 2018-09-18 NOTE — ASU DISCHARGE PLAN (ADULT/PEDIATRIC). - ACTIVITY LEVEL
no heavy lifting/Nonweightbearing right leg with crutches for assistance with walking/no weight bearing/elevate extremity/no exercise

## 2018-09-18 NOTE — DISCHARGE NOTE ADULT - HOSPITAL COURSE
The patient underwent a RIGHT ANKLE OPEN REDUCTION AND INTERNAL FIXATION on 9/18/18for treatment of a right bimalleolar ankle fracture. The patient received antibiotics consistent with SCIP guidelines. The patient was medically cleared and underwent the procedure and had no intra-operative complications. Post-operatively, the patient was seen by medicine and PT. The patient received LOVENOX for DVTP. The patient received pain medications per orthopedic pain managment protocol and the pain was appropriately controlled. Patient was evaluated by PT and instructed on gait training. The patient was NON-weight bearing. The patient did not have any post-operative medical complications. The patient was discharged in stable condition. The patient underwent a RIGHT ANKLE OPEN REDUCTION AND INTERNAL FIXATION on 9/18/18for treatment of a right trimalleolar ankle fracture. The patient received antibiotics consistent with SCIP guidelines. The patient was medically cleared and underwent the procedure and had no intra-operative complications. Post-operatively, the patient was seen by medicine and PT. The patient received LOVENOX for DVTP. The patient received pain medications per orthopedic pain managment protocol and the pain was appropriately controlled. Patient was evaluated by PT and instructed on gait training. The patient was NON-weight bearing. The patient did not have any post-operative medical complications. The patient was discharged in stable condition. The patient underwent a RIGHT ANKLE OPEN REDUCTION AND INTERNAL FIXATION on 9/18/18for treatment of a right trimalleolar ankle fracture. The patient received antibiotics consistent with SCIP guidelines. The patient was medically cleared and underwent the procedure and had no intra-operative complications. Post-operatively, the patient was seen by medicine and PT. The patient received LOVENOX while in hospital  for DVTP. The patient received pain medications per orthopedic pain management protocol and the pain was appropriately controlled. Patient was evaluated by PT and instructed on gait training. The patient was NON-weight bearing. The patient did not have any post-operative medical complications. The patient was discharged in stable condition.

## 2018-09-18 NOTE — DISCHARGE NOTE ADULT - PLAN OF CARE
Improved function and pain control The patient will be seen in the office between 1-2 weeks for splint removal and wound check. Sutures/Staples will be removed at that time. Patient may NOT shower until after re-evaluation in the office. The patient will continue with splint as applied in hospital and not remove until re-evaluation in the office. The patient will contact the office if the wound becomes red, has increasing pain, develops bleeding or discharge, an injury occurs, or has other concerns. The patient will continue LOVENOX for 2 weeks and then begin ASPIRIN for DVTP. The patient will take oxycodone & tylenol for pain control and titrate according to prescription and patient needs. The patient is NON-weight bearing on the right LOWER EXTREMITY. The patient is recommended to elevated the affected extremity to reduce swelling. If the splint/cast  becomes too tight, the patient is to immediately elevate and contact the office to discuss further management or immediately proceed to the office if unable to make contact with the office. The patient will be seen in the office between 1-2 weeks for splint removal and wound check. Sutures/Staples will be removed at that time. Patient may NOT shower until after re-evaluation in the office. The patient will continue with splint as applied in hospital and not remove until re-evaluation in the office. The patient will contact the office if the wound becomes red, has increasing pain, develops bleeding or discharge, an injury occurs, or has other concerns. The patient will continue ASPIRIN 325 twice daily for 28 days for DVTP. The patient will take oxycodone & Tylenol for pain control and titrate according to prescription and patient needs. The patient is NON-weight bearing on the right LOWER EXTREMITY. The patient is recommended to elevated the affected extremity to reduce swelling. If the splint/cast  becomes too tight, the patient is to immediately elevate and contact the office to discuss further management or immediately proceed to the office if unable to make contact with the office.

## 2018-09-18 NOTE — BRIEF OPERATIVE NOTE - PROCEDURE
<<-----Click on this checkbox to enter Procedure ORIF fracture of ankle  09/18/2018    Active  RTRASOLINI

## 2018-09-18 NOTE — DISCHARGE NOTE ADULT - CARE PLAN
Principal Discharge DX:	Closed bimalleolar fracture of right ankle, initial encounter  Goal:	Improved function and pain control  Assessment and plan of treatment:	The patient will be seen in the office between 1-2 weeks for splint removal and wound check. Sutures/Staples will be removed at that time. Patient may NOT shower until after re-evaluation in the office. The patient will continue with splint as applied in hospital and not remove until re-evaluation in the office. The patient will contact the office if the wound becomes red, has increasing pain, develops bleeding or discharge, an injury occurs, or has other concerns. The patient will continue LOVENOX for 2 weeks and then begin ASPIRIN for DVTP. The patient will take oxycodone & tylenol for pain control and titrate according to prescription and patient needs. The patient is NON-weight bearing on the right LOWER EXTREMITY. The patient is recommended to elevated the affected extremity to reduce swelling. If the splint/cast  becomes too tight, the patient is to immediately elevate and contact the office to discuss further management or immediately proceed to the office if unable to make contact with the office. Principal Discharge DX:	Closed bimalleolar fracture of right ankle, initial encounter  Goal:	Improved function and pain control  Assessment and plan of treatment:	The patient will be seen in the office between 1-2 weeks for splint removal and wound check. Sutures/Staples will be removed at that time. Patient may NOT shower until after re-evaluation in the office. The patient will continue with splint as applied in hospital and not remove until re-evaluation in the office. The patient will contact the office if the wound becomes red, has increasing pain, develops bleeding or discharge, an injury occurs, or has other concerns. The patient will continue ASPIRIN 325 twice daily for 28 days for DVTP. The patient will take oxycodone & Tylenol for pain control and titrate according to prescription and patient needs. The patient is NON-weight bearing on the right LOWER EXTREMITY. The patient is recommended to elevated the affected extremity to reduce swelling. If the splint/cast  becomes too tight, the patient is to immediately elevate and contact the office to discuss further management or immediately proceed to the office if unable to make contact with the office.

## 2018-09-18 NOTE — PROGRESS NOTE ADULT - SUBJECTIVE AND OBJECTIVE BOX
Orthopedic PA Postop Note  Patient S/P open reduction internal fixation of right ankle fx  Patient in bed comfortable   RIGHT Leg  Dressing C/D/I - ACE wrap without staining  Posterior & U splints in the appropriate positioning  DP Pulse intact - confirmed with pulse ox wave form with strong amplitude consistent with patients heart rate  Palpable compartments soft and compressible  EHL/FHL intact   Sensation intact to light touch    Vital Signs Last 24 Hrs  T(C): 37.1 (18 Sep 2018 21:40), Max: 37.1 (18 Sep 2018 21:40)  T(F): 98.7 (18 Sep 2018 21:40), Max: 98.7 (18 Sep 2018 21:40)  HR: 76 (18 Sep 2018 23:05) (76 - 103)  BP: 144/81 (18 Sep 2018 23:05) (144/81 - 164/81)  BP(mean): 96 (18 Sep 2018 23:05) (96 - 108)  RR: 14 (18 Sep 2018 23:05) (12 - 20)  SpO2: 99% (18 Sep 2018 23:05) (95% - 100%)        A/P: 43M S/P RIGHT ANKLE open reduction internal fixation  1. DVTP - LOVENOX  2. Physical Therapy   3. Pain Control as clinically indicated

## 2018-09-19 VITALS
TEMPERATURE: 98 F | OXYGEN SATURATION: 98 % | HEART RATE: 78 BPM | DIASTOLIC BLOOD PRESSURE: 58 MMHG | RESPIRATION RATE: 20 BRPM | SYSTOLIC BLOOD PRESSURE: 112 MMHG

## 2018-09-19 PROCEDURE — 86870 RBC ANTIBODY IDENTIFICATION: CPT

## 2018-09-19 PROCEDURE — 36415 COLL VENOUS BLD VENIPUNCTURE: CPT

## 2018-09-19 PROCEDURE — 76000 FLUOROSCOPY <1 HR PHYS/QHP: CPT

## 2018-09-19 PROCEDURE — 90686 IIV4 VACC NO PRSV 0.5 ML IM: CPT

## 2018-09-19 PROCEDURE — C1713: CPT

## 2018-09-19 PROCEDURE — 73610 X-RAY EXAM OF ANKLE: CPT

## 2018-09-19 PROCEDURE — 86901 BLOOD TYPING SEROLOGIC RH(D): CPT

## 2018-09-19 PROCEDURE — 86900 BLOOD TYPING SEROLOGIC ABO: CPT

## 2018-09-19 PROCEDURE — 86850 RBC ANTIBODY SCREEN: CPT

## 2018-09-19 PROCEDURE — 86880 COOMBS TEST DIRECT: CPT

## 2018-09-19 PROCEDURE — 73610 X-RAY EXAM OF ANKLE: CPT | Mod: 26,RT

## 2018-09-19 RX ORDER — INFLUENZA VIRUS VACCINE 15; 15; 15; 15 UG/.5ML; UG/.5ML; UG/.5ML; UG/.5ML
0.5 SUSPENSION INTRAMUSCULAR ONCE
Qty: 0 | Refills: 0 | Status: COMPLETED | OUTPATIENT
Start: 2018-09-19 | End: 2018-09-19

## 2018-09-19 RX ORDER — OXYCODONE HYDROCHLORIDE 5 MG/1
1 TABLET ORAL
Qty: 42 | Refills: 0 | OUTPATIENT
Start: 2018-09-19 | End: 2018-09-25

## 2018-09-19 RX ORDER — ASPIRIN/CALCIUM CARB/MAGNESIUM 324 MG
1 TABLET ORAL
Qty: 56 | Refills: 0 | OUTPATIENT
Start: 2018-09-19 | End: 2018-10-16

## 2018-09-19 RX ADMIN — HYDROMORPHONE HYDROCHLORIDE 1 MILLIGRAM(S): 2 INJECTION INTRAMUSCULAR; INTRAVENOUS; SUBCUTANEOUS at 04:51

## 2018-09-19 RX ADMIN — Medication 975 MILLIGRAM(S): at 05:59

## 2018-09-19 RX ADMIN — ENOXAPARIN SODIUM 40 MILLIGRAM(S): 100 INJECTION SUBCUTANEOUS at 11:59

## 2018-09-19 RX ADMIN — Medication 100 MILLIGRAM(S): at 05:59

## 2018-09-19 RX ADMIN — Medication 975 MILLIGRAM(S): at 08:00

## 2018-09-19 RX ADMIN — OXYCODONE HYDROCHLORIDE 10 MILLIGRAM(S): 5 TABLET ORAL at 10:00

## 2018-09-19 RX ADMIN — OXYCODONE HYDROCHLORIDE 10 MILLIGRAM(S): 5 TABLET ORAL at 03:01

## 2018-09-19 RX ADMIN — OXYCODONE HYDROCHLORIDE 10 MILLIGRAM(S): 5 TABLET ORAL at 11:00

## 2018-09-19 RX ADMIN — HYDROMORPHONE HYDROCHLORIDE 1 MILLIGRAM(S): 2 INJECTION INTRAMUSCULAR; INTRAVENOUS; SUBCUTANEOUS at 03:02

## 2018-09-19 RX ADMIN — HYDROMORPHONE HYDROCHLORIDE 1 MILLIGRAM(S): 2 INJECTION INTRAMUSCULAR; INTRAVENOUS; SUBCUTANEOUS at 00:41

## 2018-09-19 RX ADMIN — OXYCODONE HYDROCHLORIDE 10 MILLIGRAM(S): 5 TABLET ORAL at 04:51

## 2018-09-19 RX ADMIN — INFLUENZA VIRUS VACCINE 0.5 MILLILITER(S): 15; 15; 15; 15 SUSPENSION INTRAMUSCULAR at 12:42

## 2018-09-19 RX ADMIN — Medication 100 MILLIGRAM(S): at 00:38

## 2018-09-19 NOTE — PROGRESS NOTE ADULT - SUBJECTIVE AND OBJECTIVE BOX
Patient seen and examined at bedside. Comfortable in bed, c/o mild pain at operative site, states much improved from last night. Denies fever/chills, SOB/chest pain, abdominal pain, numbness/tingling. no other complaints.    Vital Signs Last 24 Hrs  T(C): 36.3 (19 Sep 2018 08:00), Max: 37.1 (18 Sep 2018 21:40)  T(F): 97.3 (19 Sep 2018 08:00), Max: 98.7 (18 Sep 2018 21:40)  HR: 70 (19 Sep 2018 08:00) (64 - 103)  BP: 108/54 (19 Sep 2018 08:00) (108/54 - 164/81)  BP(mean): 72 (19 Sep 2018 06:20) (72 - 108)  RR: 22 (19 Sep 2018 08:00) (10 - 22)  SpO2: 99% (19 Sep 2018 08:00) (95% - 100%)    RLE: Splint C/D/I. + EHL/FHL. Ext warm, cap refill brisk. SILT.    A/P: 43 y.o M s/p right ankle ORIF  - NWB  - DVTP  - d/c planning - home today when cleared by PT

## 2018-09-27 ENCOUNTER — OTHER (OUTPATIENT)
Age: 43
End: 2018-09-27

## 2018-09-27 RX ORDER — OXYCODONE 5 MG/1
5 TABLET ORAL
Qty: 24 | Refills: 0 | Status: ACTIVE | COMMUNITY
Start: 2018-09-27 | End: 1900-01-01

## 2018-10-01 ENCOUNTER — APPOINTMENT (OUTPATIENT)
Dept: ORTHOPEDIC SURGERY | Facility: CLINIC | Age: 43
End: 2018-10-01
Payer: COMMERCIAL

## 2018-10-01 VITALS
HEART RATE: 102 BPM | HEIGHT: 66 IN | WEIGHT: 180 LBS | DIASTOLIC BLOOD PRESSURE: 90 MMHG | SYSTOLIC BLOOD PRESSURE: 145 MMHG | BODY MASS INDEX: 28.93 KG/M2

## 2018-10-01 PROCEDURE — 73610 X-RAY EXAM OF ANKLE: CPT | Mod: RT

## 2018-10-01 PROCEDURE — 99024 POSTOP FOLLOW-UP VISIT: CPT

## 2018-10-29 ENCOUNTER — APPOINTMENT (OUTPATIENT)
Dept: ORTHOPEDIC SURGERY | Facility: CLINIC | Age: 43
End: 2018-10-29
Payer: COMMERCIAL

## 2018-10-29 VITALS
HEIGHT: 66 IN | BODY MASS INDEX: 28.93 KG/M2 | WEIGHT: 180 LBS | DIASTOLIC BLOOD PRESSURE: 82 MMHG | HEART RATE: 83 BPM | SYSTOLIC BLOOD PRESSURE: 143 MMHG

## 2018-10-29 PROCEDURE — 73610 X-RAY EXAM OF ANKLE: CPT | Mod: RT

## 2018-10-29 PROCEDURE — 99024 POSTOP FOLLOW-UP VISIT: CPT

## 2018-11-26 ENCOUNTER — APPOINTMENT (OUTPATIENT)
Dept: ORTHOPEDIC SURGERY | Facility: CLINIC | Age: 43
End: 2018-11-26
Payer: COMMERCIAL

## 2018-11-26 VITALS
SYSTOLIC BLOOD PRESSURE: 128 MMHG | DIASTOLIC BLOOD PRESSURE: 86 MMHG | HEIGHT: 66 IN | WEIGHT: 180 LBS | HEART RATE: 85 BPM | BODY MASS INDEX: 28.93 KG/M2

## 2018-11-26 PROCEDURE — 99024 POSTOP FOLLOW-UP VISIT: CPT

## 2018-11-26 PROCEDURE — 73610 X-RAY EXAM OF ANKLE: CPT | Mod: RT

## 2018-12-17 ENCOUNTER — APPOINTMENT (OUTPATIENT)
Dept: ORTHOPEDIC SURGERY | Facility: CLINIC | Age: 43
End: 2018-12-17
Payer: COMMERCIAL

## 2018-12-17 VITALS
TEMPERATURE: 97.8 F | HEART RATE: 70 BPM | BODY MASS INDEX: 28.93 KG/M2 | WEIGHT: 180 LBS | SYSTOLIC BLOOD PRESSURE: 149 MMHG | HEIGHT: 66 IN | DIASTOLIC BLOOD PRESSURE: 88 MMHG

## 2018-12-17 DIAGNOSIS — S82.899A OTHER FRACTURE OF UNSPECIFIED LOWER LEG, INITIAL ENCOUNTER FOR CLOSED FRACTURE: ICD-10-CM

## 2018-12-17 PROCEDURE — 99024 POSTOP FOLLOW-UP VISIT: CPT

## 2019-01-14 ENCOUNTER — APPOINTMENT (OUTPATIENT)
Dept: ORTHOPEDIC SURGERY | Facility: CLINIC | Age: 44
End: 2019-01-14
Payer: COMMERCIAL

## 2019-01-14 VITALS
HEART RATE: 76 BPM | SYSTOLIC BLOOD PRESSURE: 155 MMHG | WEIGHT: 175 LBS | HEIGHT: 66 IN | BODY MASS INDEX: 28.12 KG/M2 | DIASTOLIC BLOOD PRESSURE: 91 MMHG

## 2019-01-14 DIAGNOSIS — S82.871D DISPLACED PILON FRACTURE OF RIGHT TIBIA, SUBSEQUENT ENCOUNTER FOR CLOSED FRACTURE WITH ROUTINE HEALING: ICD-10-CM

## 2019-01-14 PROCEDURE — 73610 X-RAY EXAM OF ANKLE: CPT | Mod: RT

## 2019-01-14 PROCEDURE — 99213 OFFICE O/P EST LOW 20 MIN: CPT

## 2019-01-14 NOTE — HISTORY OF PRESENT ILLNESS
[de-identified] : ORIF R pilon fracture.  DOS: 09/18/18. [de-identified] : Pt. enters with c/o R ankle pain 6/10 while walking.  He has been going to physical therapy.  C/o limited DF ROM while walking. [de-identified] : Incision sites are healed well with no surrounding erythema, dehiscence, or drainage. Overall he has moderate swelling in his ankle is diffuse. Dorsiflexion actively is 5° passively is 10°. Plantar flexion actively 10° passively is 20°. Supination and pronation are mildly decreased secondary to swelling. No tenderness over the incision sites as well as hardware sites. No surrounding erythema.Date is mildly ataxic with a shortened right stance phase. [de-identified] : 3 views of the right ankle performed today and available for me to review. They demonstrate adequate placement of hardware with complete healing of fracture sites. No signs of hardware displacement or malalignment. No signs of hardware loosening. [de-identified] : Rolando is a 43-year-old male status post right pilon fracture ORIF. He is overall doing well. He continued physical therapy for range of motion and strength. He's also at the gym working out on his own daily. He may return to work in 2 weeks and a note was given to him. The note does state that if he is in pain or sore he may use slow down his work. Otherwise I will see him back in 2 months for clinical assessment. He agrees with the above plan and all questions were answered.

## 2019-04-15 ENCOUNTER — APPOINTMENT (OUTPATIENT)
Dept: ORTHOPEDIC SURGERY | Facility: CLINIC | Age: 44
End: 2019-04-15

## 2020-06-25 NOTE — H&P PST ADULT - CARDIOVASCULAR
negative Regular rate & rhythm, normal S1, S2; no murmurs, gallops or rubs; no S3, S4 O-Z Plasty Text: The defect edges were debeveled with a #15 scalpel blade.  Given the location of the defect, shape of the defect and the proximity to free margins an O-Z plasty (double transposition flap) was deemed most appropriate.  Using a sterile surgical marker, the appropriate transposition flaps were drawn incorporating the defect and placing the expected incisions within the relaxed skin tension lines where possible.    The area thus outlined was incised deep to adipose tissue with a #15 scalpel blade.  The skin margins were undermined to an appropriate distance in all directions utilizing iris scissors.  Hemostasis was achieved with electrocautery.  The flaps were then transposed into place, one clockwise and the other counterclockwise, and anchored with interrupted buried subcutaneous sutures.

## 2021-09-20 NOTE — PHYSICAL THERAPY INITIAL EVALUATION ADULT - WEIGHT-BEARING RESTRICTIONS: SIT/STAND, REHAB EVAL
Apixaban/Eliquis increases your risk for bleeding. Notify your doctor if you experience any of the following side effects: bleeding, coughing or vomiting blood, red or black stool, unexpected pain or swelling, itching or hives, chest pain, chest tightness, trouble breathing, changes in how much or how often you urinate, red or pink urine, numbness or tingling in your feet, or unusual muscle weakness. When Apixaban/Eliquis is taken with other medicines, they can affect how it works. Taking other medications such as aspirin, blood thinners, nonsteroidal anti-inflammatories, and medications that treat depression can increase your risk of bleeding. It is very important to tell your health care provider about all of the other medicines, including over-the-counter medications, herbs, and vitamins you are taking. DO NOT start, stop, or change the dosage of any medicine, including over-the-counter medicines, vitamins, and herbal products without your doctor’s approval. Any products containing aspirin or are nonsteroidal anti-inflammatories lessen the blood’s ability to form clots and add to the effect of Apixaban/Eliquis. Never take aspirin or medicines that contain aspirin without speaking to your doctor.
nonweight-bearing